# Patient Record
Sex: MALE | RURAL
[De-identification: names, ages, dates, MRNs, and addresses within clinical notes are randomized per-mention and may not be internally consistent; named-entity substitution may affect disease eponyms.]

---

## 2020-07-29 ENCOUNTER — OFFICE VISIT (OUTPATIENT)
Dept: RURAL CLINIC 4 | Facility: CLINIC | Age: 40
End: 2020-07-29

## 2020-07-29 RX ORDER — CITALOPRAM HYDROBROMIDE 10 MG/1
1 TABLET TABLET, FILM COATED ORAL ONCE A DAY
Status: ACTIVE | COMMUNITY

## 2020-07-29 RX ORDER — BISMUTH SUBSALICYLATE 262MG/15ML
30 ML EVERY 30 MINUTES TO 1 HOUR AS NEEDED SUSPENSION, ORAL (FINAL DOSE FORM) ORAL
Status: ACTIVE | COMMUNITY

## 2020-07-29 RX ORDER — ONDANSETRON HYDROCHLORIDE 4 MG/1
1 TABLET TABLET, FILM COATED ORAL ONCE A DAY
Status: ACTIVE | COMMUNITY

## 2020-07-29 RX ORDER — HYOSCYAMINE SULFATE 0.12 MG/1
1 TABLET AS NEEDED TABLET ORAL
Status: ACTIVE | COMMUNITY

## 2020-07-29 RX ORDER — CITALOPRAM HYDROBROMIDE 20 MG/1
1 TABLET TABLET, FILM COATED ORAL ONCE A DAY
Status: ACTIVE | COMMUNITY

## 2023-07-26 ENCOUNTER — OFFICE VISIT (OUTPATIENT)
Dept: URBAN - METROPOLITAN AREA CLINIC 82 | Facility: CLINIC | Age: 43
End: 2023-07-26
Payer: MEDICARE

## 2023-07-26 ENCOUNTER — DASHBOARD ENCOUNTERS (OUTPATIENT)
Age: 43
End: 2023-07-26

## 2023-07-26 ENCOUNTER — WEB ENCOUNTER (OUTPATIENT)
Dept: URBAN - METROPOLITAN AREA CLINIC 82 | Facility: CLINIC | Age: 43
End: 2023-07-26

## 2023-07-26 VITALS
DIASTOLIC BLOOD PRESSURE: 78 MMHG | HEIGHT: 65 IN | SYSTOLIC BLOOD PRESSURE: 152 MMHG | WEIGHT: 183.2 LBS | TEMPERATURE: 98.3 F | BODY MASS INDEX: 30.52 KG/M2 | HEART RATE: 83 BPM

## 2023-07-26 DIAGNOSIS — R19.8 ALTERNATING CONSTIPATION AND DIARRHEA: ICD-10-CM

## 2023-07-26 DIAGNOSIS — R14.0 BLOATING: ICD-10-CM

## 2023-07-26 DIAGNOSIS — R10.84 GENERALIZED ABDOMINAL PAIN: ICD-10-CM

## 2023-07-26 DIAGNOSIS — R07.89 NON-CARDIAC CHEST PAIN: ICD-10-CM

## 2023-07-26 DIAGNOSIS — Z98.890 HISTORY OF NISSEN FUNDOPLICATION: ICD-10-CM

## 2023-07-26 PROCEDURE — 99204 OFFICE O/P NEW MOD 45 MIN: CPT | Performed by: INTERNAL MEDICINE

## 2023-07-26 RX ORDER — ONDANSETRON HYDROCHLORIDE 4 MG/1
1 TABLET TABLET, FILM COATED ORAL ONCE A DAY
Status: ACTIVE | COMMUNITY

## 2023-07-26 RX ORDER — CITALOPRAM HYDROBROMIDE 20 MG/1
1 TABLET TABLET, FILM COATED ORAL ONCE A DAY
Status: ACTIVE | COMMUNITY

## 2023-07-26 RX ORDER — HYOSCYAMINE SULFATE 0.12 MG/1
1 TABLET AS NEEDED TABLET ORAL
Status: ACTIVE | COMMUNITY

## 2023-07-26 RX ORDER — CITALOPRAM HYDROBROMIDE 10 MG/1
1 TABLET TABLET, FILM COATED ORAL ONCE A DAY
Status: ACTIVE | COMMUNITY

## 2023-07-26 RX ORDER — BISMUTH SUBSALICYLATE 262MG/15ML
30 ML EVERY 30 MINUTES TO 1 HOUR AS NEEDED SUSPENSION, ORAL (FINAL DOSE FORM) ORAL
Status: ACTIVE | COMMUNITY

## 2023-07-26 NOTE — PHYSICAL EXAM GASTROINTESTINAL
Abdomen , soft, diffuse TTP, nondistended , no guarding or rigidity , no masses palpable , normal bowel sounds , Liver and Spleen , no hepatosplenomegaly

## 2023-07-26 NOTE — HPI-TODAY'S VISIT:
42 y/o white male presents for chronic GI complaints. Started after [employer] fundoplicifation.  States he had chronic symptoms of heartburn and indigestion.  At age 12 was told he had Crohns (not by a GI).   April 2018, had chest pain, ER found hiatal hernia, ultimately had Cordell because of "injury to esophagus".  Now getting nausea.  Since surgery getting pain in LUQ and LLQ, pain/pressure radiates into his chest, gets pulsations in abdomen. Chronic bloating.  Alternates between "constipated stools" and diarrhea and regular stools.  Has BM every day. Always able to pass gas but rubbing on abdomen and passing gas also provides relief.  Limits diet to mashed potatoes and Ensure, everything else causes pain.  Last colonoscopy 2 years ago, was negative.  Done at [place]. Had EGD w/botox injection 02/2022, done in attempt to alleviate pain. Last abdominal imaging [facility] 2022. Also had motility studies (mild gastroparesis per patient).  [facility] Dr. Jess Kirby in [place] has his records.   Doesn't take pain meds excpet once or twice per month.  Has been treated for H.Pylori, also treated for C.Diff.  Uses heating pad every night on abdomen which helps.  After visit reviewed his hand written history. Had Nissen 05/2019 but there were visits prior tfor "stomach and esophageal pain".  Same symptoms 02/2019, had Nissen revision 10/2019. Still same symtpoms according to his log. He has been treated with TCA, metaclopramide, Diazepam, baclofen, tizanidine, sucralfate, PPI, tramadol , methylprednisonlone oever the years.  He tried to get into [facility] and was told to come here instead.

## 2023-08-18 ENCOUNTER — TELEPHONE ENCOUNTER (OUTPATIENT)
Dept: URBAN - METROPOLITAN AREA CLINIC 82 | Facility: CLINIC | Age: 43
End: 2023-08-18

## 2023-10-20 ENCOUNTER — WEB ENCOUNTER (OUTPATIENT)
Dept: URBAN - METROPOLITAN AREA CLINIC 115 | Facility: CLINIC | Age: 43
End: 2023-10-20

## 2023-11-01 ENCOUNTER — OFFICE VISIT (OUTPATIENT)
Dept: URBAN - METROPOLITAN AREA CLINIC 82 | Facility: CLINIC | Age: 43
End: 2023-11-01

## 2024-03-29 ENCOUNTER — OFFICE (OUTPATIENT)
Dept: URBAN - NONMETROPOLITAN AREA CLINIC 1 | Facility: CLINIC | Age: 44
End: 2024-03-29
Payer: MEDICARE

## 2024-03-29 VITALS
SYSTOLIC BLOOD PRESSURE: 140 MMHG | DIASTOLIC BLOOD PRESSURE: 81 MMHG | HEIGHT: 77 IN | DIASTOLIC BLOOD PRESSURE: 92 MMHG | WEIGHT: 185 LBS | SYSTOLIC BLOOD PRESSURE: 142 MMHG | HEART RATE: 94 BPM

## 2024-03-29 DIAGNOSIS — K92.1 MELENA: ICD-10-CM

## 2024-03-29 DIAGNOSIS — K21.9 GASTRO-ESOPHAGEAL REFLUX DISEASE WITHOUT ESOPHAGITIS: ICD-10-CM

## 2024-03-29 DIAGNOSIS — R19.7 DIARRHEA, UNSPECIFIED: ICD-10-CM

## 2024-03-29 DIAGNOSIS — F34.1 DYSTHYMIC DISORDER: ICD-10-CM

## 2024-03-29 DIAGNOSIS — R14.0 ABDOMINAL DISTENSION (GASEOUS): ICD-10-CM

## 2024-03-29 PROCEDURE — 36415 COLL VENOUS BLD VENIPUNCTURE: CPT | Performed by: NURSE PRACTITIONER

## 2024-03-29 PROCEDURE — 99204 OFFICE O/P NEW MOD 45 MIN: CPT | Performed by: NURSE PRACTITIONER

## 2024-03-29 NOTE — SERVICENOTES
Followup in office after procedures.
The risks for EGD and colonoscopy were discussed with him and he agrees to proceed.  
The bowel prep was prescribed and instructions were provided.

## 2024-03-29 NOTE — SERVICEHPINOTES
In today with complaints of irregular bowel movements, abdominal bloating, and hematochezia.  He says he has had GI problems his entire life.  He gives history of a Nissen fundoplication for hiatal hernia some years ago and rarely has heartburn  or upper GI problems.   His bowel movements are irregular, going from constipation to diarrhea in a matter of days, and he sees blood with his stools "all the time".  He had hemorrhoid surgery 2022.  He says he was diagnosed with  "probable" Crohn's disease at age 12 and took sulfasalazine plus folic acid for awhile, but is not taking any medications currently.  His GI evaluation was done at [facility] and [facility], but he says his gastroenterologists couldn't ever clench a solid diagnosis.    His chronic illnesses include depression and chronic GERD.br   He'll be scheduled for EGD, colonoscopy and CT enterography.  Labs today.  Will request records from previous GI workup. 
br
br
brAddendum 4/22/24:   
br -CT enterography 4/19/24:  Mildly prominent mesenteric and retroperitoneal lymph nodes within the abdomen and pelvis. These may be reactive but are nonspecific.
br 
br -IBD panel suggestive of Crohn's
br 
br -CDT positive, treatment prescribed.
br
br -Procedures pending, will wait on Dr. Ch's recommendation.br br

## 2024-07-31 ENCOUNTER — ON CAMPUS - OUTPATIENT (OUTPATIENT)
Dept: URBAN - NONMETROPOLITAN AREA HOSPITAL 34 | Facility: HOSPITAL | Age: 44
End: 2024-07-31
Payer: MEDICARE

## 2024-07-31 DIAGNOSIS — K21.00 GASTRO-ESOPHAGEAL REFLUX DISEASE WITH ESOPHAGITIS, WITHOUT B: ICD-10-CM

## 2024-07-31 DIAGNOSIS — K62.5 HEMORRHAGE OF ANUS AND RECTUM: ICD-10-CM

## 2024-07-31 DIAGNOSIS — K29.50 UNSPECIFIED CHRONIC GASTRITIS WITHOUT BLEEDING: ICD-10-CM

## 2024-07-31 PROCEDURE — 45378 DIAGNOSTIC COLONOSCOPY: CPT | Performed by: INTERNAL MEDICINE

## 2024-07-31 PROCEDURE — 43239 EGD BIOPSY SINGLE/MULTIPLE: CPT | Mod: 51 | Performed by: INTERNAL MEDICINE

## 2024-08-14 ENCOUNTER — OFFICE (OUTPATIENT)
Dept: URBAN - NONMETROPOLITAN AREA CLINIC 1 | Facility: CLINIC | Age: 44
End: 2024-08-14
Payer: MEDICARE

## 2024-08-14 VITALS
DIASTOLIC BLOOD PRESSURE: 96 MMHG | WEIGHT: 195 LBS | HEIGHT: 77 IN | SYSTOLIC BLOOD PRESSURE: 127 MMHG | HEART RATE: 87 BPM

## 2024-08-14 DIAGNOSIS — A04.72 ENTEROCOLITIS DUE TO CLOSTRIDIUM DIFFICILE, NOT SPECIFIED AS: ICD-10-CM

## 2024-08-14 DIAGNOSIS — K92.1 MELENA: ICD-10-CM

## 2024-08-14 DIAGNOSIS — K20.90 ESOPHAGITIS, UNSPECIFIED WITHOUT BLEEDING: ICD-10-CM

## 2024-08-14 PROCEDURE — 99213 OFFICE O/P EST LOW 20 MIN: CPT | Performed by: NURSE PRACTITIONER

## 2024-08-14 RX ORDER — FAMOTIDINE 20 MG/1
40 TABLET, FILM COATED ORAL
Qty: 60 | Refills: 11 | Status: ACTIVE
Start: 2024-08-14

## 2024-08-14 NOTE — SERVICEHPINOTES
In for followup.  When I saw him in March, he was having irregular BMs, bloating and intermittent hematochezia.   He had a positive CDT and completed treatment.  Sometimes sees bright red spotting intermittently. He has had surgery for hemorrhoids in the past.   His colonoscopy was normal, no colitis noted.     EGD showed esophagitis and he was prescribed a PPI, but could not tolerate it.  He says he has had trouble with PPIs over the years.  He can tolerate Pepcid though and I suggested he take it twice a day.br
br 
br -CT enterography 4/19/24:  Mildly prominent mesenteric and retroperitoneal lymph nodes within the abdomen and pelvis. These may be reactive but are nonspecific.   (He had c.diff infection at the time)br
br   -EGD/ colonoscopy 7/31/24 by Dr. Nieto-
br -EGD Findings:brEsophagus:  LA grade C esophagitis was noted in the distal esophagus. The esophagus otherwise appeared normal.brGE Junction:  There was evidence of prior Nissen fundoplication which appeared to be mostly intact.brStomach:  Normal with no ulcer, mass, or erosion. Multiple random cold forceps biopsies were taken from the antrum and body of the stomach and placed in jar one.brPylorus:  Normal and patent.brDuodenum:  Normal with no ulcer, duodenitis, mass, AVM, or stricture.
br-Colon Findings: The terminal ileum was normal. The colonic mucosal vascular pattern, folds, and color were all normal with no polyps, masses, ulcerations, diverticulosis, or colitis. Retroflexed exam and anal inspection were normal
br
Advised followup colonoscopy in10 years.br
brFinal Pathologic Diagnosis:br   ENDOSCOPIC BIOPSY "GASTRIC":br   - Benign gastric mucosa with patchy mild superficial chronic gastritis.br   - No evidence of intestinal metaplasia/dysplasia.br   - Negative for Helicobacter pylori-like organisms

## 2024-08-14 NOTE — SERVICENOTES
I suspect his intermittent hematochezia comes from hemorrhoids.   
For esophagitis and bloating, he is prescribed b.i.d. H2 blocker.
Followup as needed.

## 2025-04-17 ENCOUNTER — OFFICE (OUTPATIENT)
Dept: URBAN - NONMETROPOLITAN AREA CLINIC 1 | Facility: CLINIC | Age: 45
End: 2025-04-17
Payer: MEDICARE

## 2025-04-17 VITALS
DIASTOLIC BLOOD PRESSURE: 90 MMHG | SYSTOLIC BLOOD PRESSURE: 150 MMHG | HEIGHT: 77 IN | HEART RATE: 80 BPM | DIASTOLIC BLOOD PRESSURE: 105 MMHG | SYSTOLIC BLOOD PRESSURE: 138 MMHG | WEIGHT: 192 LBS

## 2025-04-17 DIAGNOSIS — R19.7 DIARRHEA, UNSPECIFIED: ICD-10-CM

## 2025-04-17 DIAGNOSIS — K92.1 MELENA: ICD-10-CM

## 2025-04-17 PROCEDURE — 99214 OFFICE O/P EST MOD 30 MIN: CPT | Performed by: NURSE PRACTITIONER

## 2025-04-29 ENCOUNTER — OFFICE (OUTPATIENT)
Dept: URBAN - NONMETROPOLITAN AREA CLINIC 1 | Facility: CLINIC | Age: 45
End: 2025-04-29
Payer: MEDICARE

## 2025-04-29 DIAGNOSIS — K92.1 MELENA: ICD-10-CM

## 2025-04-29 PROCEDURE — 91110 GI TRC IMG INTRAL ESOPH-ILE: CPT | Performed by: STUDENT IN AN ORGANIZED HEALTH CARE EDUCATION/TRAINING PROGRAM
